# Patient Record
Sex: FEMALE | Race: WHITE | Employment: FULL TIME | URBAN - METROPOLITAN AREA
[De-identification: names, ages, dates, MRNs, and addresses within clinical notes are randomized per-mention and may not be internally consistent; named-entity substitution may affect disease eponyms.]

---

## 2023-11-02 ENCOUNTER — OFFICE VISIT (OUTPATIENT)
Dept: PAIN MANAGEMENT | Age: 43
End: 2023-11-02
Payer: MEDICARE

## 2023-11-02 VITALS
SYSTOLIC BLOOD PRESSURE: 125 MMHG | DIASTOLIC BLOOD PRESSURE: 85 MMHG | OXYGEN SATURATION: 100 % | WEIGHT: 213.6 LBS | HEART RATE: 66 BPM

## 2023-11-02 DIAGNOSIS — M54.16 LUMBAR RADICULOPATHY: ICD-10-CM

## 2023-11-02 DIAGNOSIS — Z79.891 CHRONIC PRESCRIPTION OPIATE USE: ICD-10-CM

## 2023-11-02 DIAGNOSIS — F41.1 GAD (GENERALIZED ANXIETY DISORDER): ICD-10-CM

## 2023-11-02 DIAGNOSIS — M51.36 LUMBAR DEGENERATIVE DISC DISEASE: ICD-10-CM

## 2023-11-02 DIAGNOSIS — G89.4 CHRONIC PAIN SYNDROME: ICD-10-CM

## 2023-11-02 DIAGNOSIS — Z51.81 ENCOUNTER FOR THERAPEUTIC DRUG MONITORING: ICD-10-CM

## 2023-11-02 DIAGNOSIS — F32.89 OTHER DEPRESSION: ICD-10-CM

## 2023-11-02 DIAGNOSIS — M47.817 LUMBOSACRAL SPONDYLOSIS WITHOUT MYELOPATHY: Primary | ICD-10-CM

## 2023-11-02 DIAGNOSIS — M47.816 ARTHRITIS OF LUMBAR SPINE: ICD-10-CM

## 2023-11-02 DIAGNOSIS — G57.12 MERALGIA PARESTHETICA, LEFT LOWER LIMB: ICD-10-CM

## 2023-11-02 PROCEDURE — 99204 OFFICE O/P NEW MOD 45 MIN: CPT | Performed by: NURSE PRACTITIONER

## 2023-11-02 RX ORDER — PANTOPRAZOLE SODIUM 40 MG/1
40 TABLET, DELAYED RELEASE ORAL 2 TIMES DAILY
COMMUNITY
Start: 2022-12-12

## 2023-11-02 RX ORDER — TIZANIDINE 4 MG/1
8 TABLET ORAL NIGHTLY PRN
COMMUNITY
Start: 2023-08-03

## 2023-11-02 RX ORDER — OXYCODONE HYDROCHLORIDE 5 MG/1
5 TABLET ORAL EVERY 6 HOURS PRN
Qty: 112 TABLET | Refills: 0 | Status: SHIPPED | OUTPATIENT
Start: 2023-11-02 | End: 2023-11-30

## 2023-11-02 RX ORDER — ESCITALOPRAM OXALATE 20 MG/1
20 TABLET ORAL DAILY
COMMUNITY
Start: 2020-09-09

## 2023-11-02 RX ORDER — ACYCLOVIR 400 MG/1
400 TABLET ORAL 2 TIMES DAILY
COMMUNITY
Start: 2020-08-26

## 2023-11-02 RX ORDER — DEXTROAMPHETAMINE SACCHARATE, AMPHETAMINE ASPARTATE, DEXTROAMPHETAMINE SULFATE AND AMPHETAMINE SULFATE 3.75; 3.75; 3.75; 3.75 MG/1; MG/1; MG/1; MG/1
15 TABLET ORAL DAILY
COMMUNITY
Start: 2023-10-17

## 2023-11-02 RX ORDER — SUCRALFATE ORAL 1 G/10ML
1 SUSPENSION ORAL 4 TIMES DAILY
COMMUNITY
Start: 2022-12-12

## 2023-11-02 RX ORDER — IBUPROFEN 800 MG/1
1 TABLET ORAL EVERY 8 HOURS PRN
COMMUNITY
Start: 2020-08-03

## 2023-11-02 RX ORDER — PANTOPRAZOLE SODIUM 40 MG/1
1 TABLET, DELAYED RELEASE ORAL DAILY
COMMUNITY
Start: 2020-06-25

## 2023-11-02 NOTE — PROGRESS NOTES
HISTORY OF PRESENT ILLNESS:  Ms. Jocy Correa is a 37 y.o. female presents for consultation at the request of Dr. Chong Rodriguez. Her presenting problems are right hip, LLE, tailbone, left lateral knee pain. She has also been evaluated by rheumatology, orthopedics, pain management. Onset of pain began about 13 years ago. She rates the pain 8/10 and describes it as sharp, aching, burning, cold sensation in calin area. Pain is greater in her left anterior thigh and right hip than other body regions. Pain is made worse by: Standing, walking, lifting, bending, sitting, cooking, vacuuming, laundry, carry groceries, yard work, getting in and out of chair, going up and down stairs, putting she socks on, working. Activities that have been limited by pain that she otherwise tolerated well are walking, working, ADLs, home exercises. Alternative therapies she has previously attempted are pain medication, anti-inflammatories, cervixes, steroids, injections, ice and heat packs, epidural steroid injections, tenderness, exercise by physical therapy. Current treatment regimen has helped relieve about 20% of the pain. Relieving factors of pain include pain medication, muscle relaxants, lying flat on his back, lying on side in bed position. Shedenies side effects from the current pain regimen. Patient reports mood is depressed and sleep patterns are Poor. Patient deniesneurological bowel or bladder. Patient denies misusing/abusing her narcotic pain medications or using any illegal drugs. she admits to morning stiffness, fatigue, and headaches. Patient reports she is within 30 pounds she has a 10, gastric sleeve surgery in 2012. She is working at [employer], she states has frequent falls related right hip popping frequently and low back pain. She states she is rheumatoid arthritis and is seeing a rheumatologist in [facility].   She is on Adderall for ADHD, history of gabapentin and Lyrica failed, r/t SE.      ROS:  Review of Systems

## 2023-11-06 ENCOUNTER — TELEPHONE (OUTPATIENT)
Dept: PAIN MANAGEMENT | Age: 43
End: 2023-11-06

## 2023-11-06 NOTE — TELEPHONE ENCOUNTER
Pt spoke with MATT about getting injections. She is currently in a lot of pain going down the left leg and wanted to now if these injections would help. She states the pain medication she was given is not working for the pain . Would like a call back about the injection information

## 2023-11-07 NOTE — TELEPHONE ENCOUNTER
I have not been able to finish her new patient note yet. I also cannot find her MRI of lumbar spine on file. If she wants to be seen urgently for an injection I can refer her to Dr. Parham in [place].

## 2023-11-08 NOTE — TELEPHONE ENCOUNTER
I called patient, explained previous notes.  She said she wants if MATT will give her a cortisone shot in the office.    She also said she reached out to [facility] on yesterday to see if about getting another MARYLIN.    Patient also stated that the Oxycodone 5mg is not working, and she was comfortable at with the 10 mg.  She is working at [employer] every date because they are working with her disability.    She said she is having bad shooting pains down her legs at night.    Please advise.

## 2023-11-14 PROBLEM — Z79.891 CHRONIC PRESCRIPTION OPIATE USE: Status: ACTIVE | Noted: 2023-11-14

## 2023-11-14 PROBLEM — M51.36 LUMBAR DEGENERATIVE DISC DISEASE: Status: ACTIVE | Noted: 2023-11-14

## 2023-11-14 PROBLEM — G89.4 CHRONIC PAIN SYNDROME: Status: ACTIVE | Noted: 2023-11-14

## 2023-11-14 PROBLEM — M51.369 LUMBAR DEGENERATIVE DISC DISEASE: Status: ACTIVE | Noted: 2023-11-14

## 2023-11-14 PROBLEM — M47.816 ARTHRITIS OF LUMBAR SPINE: Status: RESOLVED | Noted: 2023-11-14 | Resolved: 2023-11-14

## 2023-11-14 PROBLEM — M54.16 LUMBAR RADICULOPATHY: Status: ACTIVE | Noted: 2023-11-14

## 2023-11-14 PROBLEM — F32.A DEPRESSION: Status: ACTIVE | Noted: 2023-11-14

## 2023-11-14 PROBLEM — G57.12 MERALGIA PARESTHETICA, LEFT LOWER LIMB: Status: ACTIVE | Noted: 2023-11-14

## 2023-11-14 PROBLEM — M47.816 ARTHRITIS OF LUMBAR SPINE: Status: ACTIVE | Noted: 2023-11-14

## 2023-11-14 PROBLEM — Z51.81 ENCOUNTER FOR THERAPEUTIC DRUG MONITORING: Status: ACTIVE | Noted: 2023-11-14

## 2023-11-14 PROBLEM — M47.817 LUMBOSACRAL SPONDYLOSIS WITHOUT MYELOPATHY: Status: ACTIVE | Noted: 2023-11-14

## 2023-11-14 ASSESSMENT — ENCOUNTER SYMPTOMS
SHORTNESS OF BREATH: 0
BACK PAIN: 1
VOMITING: 0
WHEEZING: 0
ABDOMINAL PAIN: 0

## 2023-11-30 ENCOUNTER — OFFICE VISIT (OUTPATIENT)
Dept: PAIN MANAGEMENT | Age: 43
End: 2023-11-30
Payer: MEDICARE

## 2023-11-30 ENCOUNTER — TELEPHONE (OUTPATIENT)
Dept: PAIN MANAGEMENT | Age: 43
End: 2023-11-30

## 2023-11-30 VITALS
HEART RATE: 96 BPM | SYSTOLIC BLOOD PRESSURE: 132 MMHG | WEIGHT: 213 LBS | OXYGEN SATURATION: 99 % | DIASTOLIC BLOOD PRESSURE: 89 MMHG

## 2023-11-30 DIAGNOSIS — M47.817 LUMBOSACRAL SPONDYLOSIS WITHOUT MYELOPATHY: Primary | ICD-10-CM

## 2023-11-30 DIAGNOSIS — G89.4 CHRONIC PAIN SYNDROME: ICD-10-CM

## 2023-11-30 DIAGNOSIS — M51.36 LUMBAR DEGENERATIVE DISC DISEASE: ICD-10-CM

## 2023-11-30 DIAGNOSIS — Z51.81 ENCOUNTER FOR THERAPEUTIC DRUG MONITORING: ICD-10-CM

## 2023-11-30 DIAGNOSIS — Z79.891 CHRONIC PRESCRIPTION OPIATE USE: ICD-10-CM

## 2023-11-30 DIAGNOSIS — F32.89 OTHER DEPRESSION: ICD-10-CM

## 2023-11-30 DIAGNOSIS — M47.817 LUMBOSACRAL SPONDYLOSIS WITHOUT MYELOPATHY: ICD-10-CM

## 2023-11-30 DIAGNOSIS — F41.1 GAD (GENERALIZED ANXIETY DISORDER): ICD-10-CM

## 2023-11-30 DIAGNOSIS — M47.816 ARTHRITIS OF LUMBAR SPINE: ICD-10-CM

## 2023-11-30 DIAGNOSIS — G57.12 MERALGIA PARESTHETICA, LEFT LOWER LIMB: ICD-10-CM

## 2023-11-30 DIAGNOSIS — M54.16 LUMBAR RADICULOPATHY: ICD-10-CM

## 2023-11-30 PROCEDURE — 99213 OFFICE O/P EST LOW 20 MIN: CPT | Performed by: NURSE PRACTITIONER

## 2023-11-30 RX ORDER — OXYCODONE HYDROCHLORIDE 5 MG/1
5 TABLET ORAL EVERY 6 HOURS PRN
Qty: 112 TABLET | Refills: 0 | Status: SHIPPED | OUTPATIENT
Start: 2023-11-30 | End: 2023-12-28

## 2023-11-30 RX ORDER — BUPRENORPHINE 5 UG/H
1 PATCH TRANSDERMAL WEEKLY
Qty: 4 PATCH | Refills: 0 | Status: SHIPPED | OUTPATIENT
Start: 2023-11-30 | End: 2023-12-30

## 2023-11-30 NOTE — TELEPHONE ENCOUNTER
Submitted PA for [facility] Key: [id number] STATUS: PENDING. Follow up done daily; if no response in three days we will refax for status check. If another three days goes by with no response we will call the insurance for status.

## 2023-11-30 NOTE — TELEPHONE ENCOUNTER
Pt needs buprenorphine (BUTRANS) 5 MCG/HR PTWK sent to pharmacy in [place]     [facility] [address] [place] [address]  Ph: [phone]

## 2023-11-30 NOTE — PROGRESS NOTES
Lauren Craft  1980  [id number]      HISTORY OF PRESENT ILLNESS: Ms. Greyson Coates is a 37 y.o. female returns for a follow up visit for pain management  She has a diagnosis of   1. Lumbosacral spondylosis without myelopathy    2. Encounter for therapeutic drug monitoring    3. Lumbar radiculopathy    4. Lumbar degenerative disc disease    5. Chronic pain syndrome    6. Other depression    7. ASHLEY (generalized anxiety disorder)    8. Arthritis of lumbar spine    9. Meralgia paresthetica, left lower limb    10. Chronic prescription opiate use    . New Medications since Last Office visit have been reviewed with patient. As per Information Obtained from the PADT (Patient Assessment and Documentation Tool)    She complains of pain in the lower back, bilateral legs and feet. She rates the pain 8/10 and describes it as sharp, aching, pins and needles. Current treatment regimen has helped relieve about 30% of the pain since beginning treatment plan. She denies any side effects from the current pain regimen. Patient reports that since implementation of their treatment plan; their physical functioning is unchanged, family/social relationships are unchanged, mood is unchanged sleep patterns are worse, and that the overall functioning is unchanged. Patient denies/admits that any of the above have changed since last office visit. Patient denies misusing/abusing her narcotic pain medications or using any illegal drugs. Upon obtaining medical history from Ms. Greyson Coates    ALLERGIES: Patients list of allergies were reviewed     MEDICATIONS: Ms. Greyson Coates list of medications were reviewed. Her current medications are   Outpatient Medications Prior to Visit   Medication Sig Dispense Refill    escitalopram (LEXAPRO) 20 MG tablet Take 1 tablet by mouth daily      pantoprazole (PROTONIX) 40 MG tablet Take 1 tablet by mouth 2 times daily      acyclovir (ZOVIRAX) 400 MG tablet Take 1 tablet by mouth 2 times daily      sucralfate

## 2023-12-01 NOTE — TELEPHONE ENCOUNTER
. Resend medication notification. Patient would like medication Kwaku Perea resend to name of pharmacy   [address] [place] [address], [address] [place] [address]       pharmacy  # [phone]     Reason:Medication is too much in [place] she needs to be sent to the pharmacy in [place].

## 2023-12-04 RX ORDER — BUPRENORPHINE 5 UG/H
1 PATCH TRANSDERMAL WEEKLY
Qty: 4 PATCH | Refills: 0 | Status: SHIPPED | OUTPATIENT
Start: 2023-12-04 | End: 2023-12-21

## 2023-12-04 NOTE — TELEPHONE ENCOUNTER
Which medication is requiring PA. Please advise. There is Roxicodone and Butrans listed in below message? Rodney Mora has an approval documented in Encounter 11/30/2023. If this requires a response please respond to the pool ( P [facility]X [address]). Thank you please advise patient.

## 2023-12-05 NOTE — TELEPHONE ENCOUNTER
Patient called and the PA was approved but the PA was approved for 30 days but the prescription is written for 28 days. A new prescription needs to be sent for 30 days to [facility] in [facility] [phone].  Please call pt when the new prescription is sent over

## 2023-12-05 NOTE — TELEPHONE ENCOUNTER
The current APPROVAL is still good until 5/29/2024. Location of the pharmacy no matter. If the patient's insurance requires use of specific pharmacy nothing PA can do. I called the patient, and figured out that the pharmacy is trying to fill BUPRENORPHINE PATCH. The APPROVAL is for BUTRANS. The pharmacy was notified by  to run though [facility]. If this requires a response please respond to the pool ( P [facility]X [address]). Thank you please advise patient.

## 2023-12-05 NOTE — TELEPHONE ENCOUNTER
Spoke with Pt and yes the medication that is needed is Eamon   Pa was approved for [facility],   But the needed PA is for the Eduin Castroon.      Is that 11/30/23 approval still valid

## 2023-12-06 ENCOUNTER — TELEPHONE (OUTPATIENT)
Dept: PAIN MANAGEMENT | Age: 43
End: 2023-12-06

## 2023-12-06 NOTE — TELEPHONE ENCOUNTER
Pt called stating new script is needed for the Butrans 5 MCG/HRPTWK. PA was approved for 30 days but the script was only for 28days. Pharmacy wont fill due to the mismatch  qty    Please advised.

## 2023-12-07 ENCOUNTER — PATIENT MESSAGE (OUTPATIENT)
Dept: PAIN MANAGEMENT | Age: 43
End: 2023-12-07

## 2023-12-07 DIAGNOSIS — M51.36 LUMBAR DEGENERATIVE DISC DISEASE: ICD-10-CM

## 2023-12-07 DIAGNOSIS — G89.4 CHRONIC PAIN SYNDROME: ICD-10-CM

## 2023-12-07 DIAGNOSIS — M54.16 LUMBAR RADICULOPATHY: ICD-10-CM

## 2023-12-07 DIAGNOSIS — Z51.81 ENCOUNTER FOR THERAPEUTIC DRUG MONITORING: ICD-10-CM

## 2023-12-08 RX ORDER — GABAPENTIN 600 MG/1
600 TABLET ORAL 3 TIMES DAILY
Qty: 90 TABLET | Refills: 0 | Status: SHIPPED | OUTPATIENT
Start: 2023-12-08 | End: 2024-01-07

## 2023-12-08 NOTE — TELEPHONE ENCOUNTER
From: Hernesto To  To: Garfield Reid  Sent: 12/7/2023 12:35 PM EST  Subject: Kyleigh Solano, I was needing a refill on my 600mg. Kyleigh  To be sent to [facility] in [place]. [phone].

## 2024-01-02 ENCOUNTER — TELEPHONE (OUTPATIENT)
Dept: PAIN MANAGEMENT | Age: 44
End: 2024-01-02

## 2024-01-02 DIAGNOSIS — G89.4 CHRONIC PAIN SYNDROME: ICD-10-CM

## 2024-01-02 DIAGNOSIS — M51.36 LUMBAR DEGENERATIVE DISC DISEASE: ICD-10-CM

## 2024-01-02 DIAGNOSIS — M54.16 LUMBAR RADICULOPATHY: ICD-10-CM

## 2024-01-02 DIAGNOSIS — Z51.81 ENCOUNTER FOR THERAPEUTIC DRUG MONITORING: ICD-10-CM

## 2024-01-02 NOTE — TELEPHONE ENCOUNTER
Resend medication notification.    Who is requesting the change? Luna Bro    Reason why medication needs to be sent:  Prescription was sent to the wrong pharmacy    Medication Name: Gabapentin    Send to pharmacy: [facility] [place], [place]     Pharmacy #[phone]

## 2024-01-03 ENCOUNTER — PATIENT MESSAGE (OUTPATIENT)
Dept: PAIN MANAGEMENT | Age: 44
End: 2024-01-03

## 2024-01-03 NOTE — TELEPHONE ENCOUNTER
From: Laurel Bro  To: Cristinaendy Moreno  Sent: 1/3/2024 6:21 AM EST  Subject: Migraine medication     Hi Cristina,  Oh my goodness girl. The sample of Ubrelvy you gave me has done wonders for my migraines and I'd go a day or two after taking the medicine without having another one. Do I have to wait till my next appt on the 15th to get a full script as needed? Also, I was going over with my medicaid coverage with a . And they brought it to my attention that you didn't sign up this year to be [place] as a provider [place] so my medicine is covered on my insurance other than the pain medicine that is. Thank you so much for your time and care.   Laurel Bro

## 2024-01-05 DIAGNOSIS — M51.36 LUMBAR DEGENERATIVE DISC DISEASE: ICD-10-CM

## 2024-01-05 DIAGNOSIS — G89.4 CHRONIC PAIN SYNDROME: ICD-10-CM

## 2024-01-05 DIAGNOSIS — Z51.81 ENCOUNTER FOR THERAPEUTIC DRUG MONITORING: ICD-10-CM

## 2024-01-05 DIAGNOSIS — M54.16 LUMBAR RADICULOPATHY: ICD-10-CM

## 2024-01-08 RX ORDER — GABAPENTIN 600 MG/1
600 TABLET ORAL 3 TIMES DAILY
Qty: 90 TABLET | Refills: 1 | Status: SHIPPED | OUTPATIENT
Start: 2024-01-08 | End: 2024-03-08

## 2024-01-08 RX ORDER — UBROGEPANT 100 MG/1
TABLET ORAL
Qty: 8 TABLET | Refills: 0 | Status: SHIPPED | OUTPATIENT
Start: 2024-01-08

## 2024-01-08 NOTE — TELEPHONE ENCOUNTER
Patient was notified via [employer] that Gabapentin was sent to [facility] in [place], [place].

## 2024-01-09 ENCOUNTER — TELEPHONE (OUTPATIENT)
Dept: PAIN MANAGEMENT | Age: 44
End: 2024-01-09

## 2024-01-09 DIAGNOSIS — G43.909 EPISODIC MIGRAINE: Primary | ICD-10-CM

## 2024-01-09 NOTE — TELEPHONE ENCOUNTER
Submitted PA for Ubrelvy Via [facility] Key: [id number] STATUS: NOT SENT    What is the MIGRAINE diagnoses?    Please respond to the pool ( P MHCX PSC MEDICATION PRE-AUTH).      Thank you.

## 2024-01-11 RX ORDER — GABAPENTIN 600 MG/1
TABLET ORAL
Qty: 90 TABLET | Refills: 1 | OUTPATIENT
Start: 2024-01-11

## 2024-01-12 PROBLEM — G43.909 EPISODIC MIGRAINE: Status: ACTIVE | Noted: 2024-01-12

## 2024-01-15 ENCOUNTER — OFFICE VISIT (OUTPATIENT)
Dept: PAIN MANAGEMENT | Age: 44
End: 2024-01-15
Payer: MEDICARE

## 2024-01-15 ENCOUNTER — TELEPHONE (OUTPATIENT)
Dept: PAIN MANAGEMENT | Age: 44
End: 2024-01-15

## 2024-01-15 VITALS
SYSTOLIC BLOOD PRESSURE: 128 MMHG | WEIGHT: 221 LBS | HEART RATE: 83 BPM | OXYGEN SATURATION: 100 % | DIASTOLIC BLOOD PRESSURE: 82 MMHG

## 2024-01-15 DIAGNOSIS — G43.909 EPISODIC MIGRAINE: ICD-10-CM

## 2024-01-15 DIAGNOSIS — G57.12 MERALGIA PARESTHETICA, LEFT LOWER LIMB: ICD-10-CM

## 2024-01-15 DIAGNOSIS — Z79.891 CHRONIC PRESCRIPTION OPIATE USE: ICD-10-CM

## 2024-01-15 DIAGNOSIS — M47.816 ARTHRITIS OF LUMBAR SPINE: ICD-10-CM

## 2024-01-15 DIAGNOSIS — G89.4 CHRONIC PAIN SYNDROME: ICD-10-CM

## 2024-01-15 DIAGNOSIS — M47.817 LUMBOSACRAL SPONDYLOSIS WITHOUT MYELOPATHY: Primary | ICD-10-CM

## 2024-01-15 DIAGNOSIS — M51.36 LUMBAR DEGENERATIVE DISC DISEASE: ICD-10-CM

## 2024-01-15 DIAGNOSIS — F41.1 GAD (GENERALIZED ANXIETY DISORDER): ICD-10-CM

## 2024-01-15 DIAGNOSIS — F32.89 OTHER DEPRESSION: ICD-10-CM

## 2024-01-15 DIAGNOSIS — M54.16 LUMBAR RADICULOPATHY: ICD-10-CM

## 2024-01-15 DIAGNOSIS — Z51.81 ENCOUNTER FOR THERAPEUTIC DRUG MONITORING: ICD-10-CM

## 2024-01-15 PROCEDURE — 99214 OFFICE O/P EST MOD 30 MIN: CPT | Performed by: NURSE PRACTITIONER

## 2024-01-15 PROCEDURE — 20553 NJX 1/MLT TRIGGER POINTS 3/>: CPT | Performed by: NURSE PRACTITIONER

## 2024-01-15 RX ORDER — BUPRENORPHINE 5 UG/H
1 PATCH TRANSDERMAL WEEKLY
Qty: 4 PATCH | Refills: 0 | Status: SHIPPED | OUTPATIENT
Start: 2024-01-15 | End: 2024-02-12

## 2024-01-15 RX ORDER — UBROGEPANT 100 MG/1
TABLET ORAL
Qty: 8 TABLET | Refills: 0 | Status: SHIPPED | OUTPATIENT
Start: 2024-01-15

## 2024-01-15 RX ORDER — OXYCODONE HYDROCHLORIDE 5 MG/1
5 TABLET ORAL EVERY 6 HOURS PRN
Qty: 112 TABLET | Refills: 0 | Status: SHIPPED | OUTPATIENT
Start: 2024-01-15 | End: 2024-02-12

## 2024-01-15 RX ORDER — CLINDAMYCIN HYDROCHLORIDE 150 MG/1
150 CAPSULE ORAL 4 TIMES DAILY
COMMUNITY

## 2024-01-15 RX ORDER — GABAPENTIN 600 MG/1
600 TABLET ORAL 3 TIMES DAILY
Qty: 90 TABLET | Refills: 1 | Status: SHIPPED | OUTPATIENT
Start: 2024-01-15 | End: 2024-03-15

## 2024-01-15 RX ORDER — TRIAMCINOLONE ACETONIDE 40 MG/ML
40 INJECTION, SUSPENSION INTRA-ARTICULAR; INTRAMUSCULAR ONCE
Status: COMPLETED | OUTPATIENT
Start: 2024-01-15 | End: 2024-01-15

## 2024-01-15 RX ADMIN — TRIAMCINOLONE ACETONIDE 40 MG: 40 INJECTION, SUSPENSION INTRA-ARTICULAR; INTRAMUSCULAR at 16:32

## 2024-01-15 NOTE — TELEPHONE ENCOUNTER
Submitted PA for Ubrelvy Via [facility] Key: [id number] STATUS: PENDING.    Follow up done daily; if no decision with in three days we will refax.  If another three days goes by with no decision will call the insurance for status.

## 2024-01-15 NOTE — TELEPHONE ENCOUNTER
The medication was DENIED; DENIAL letter uploaded to MEDIA.    Must Try/Fail Two Triptan's.    If you want an APPEAL; please note in this encounter what new information you would like to APPEAL with.  Once complete route back to PA POOL.    If this requires a response please respond to the pool ( P MHCX PSC MEDICATION PRE-AUTH).      Thank you please advise patient.

## 2024-01-15 NOTE — PROGRESS NOTES
Laurel Bro  1980  [id number]      HISTORY OF PRESENT ILLNESS: Ms. Bro is a 43 y.o. female returns for a follow up visit for pain management  She has a diagnosis of   1. Lumbosacral spondylosis without myelopathy    2. Episodic migraine    3. Encounter for therapeutic drug monitoring    4. Lumbar radiculopathy    5. Lumbar degenerative disc disease    6. Chronic pain syndrome    7. Other depression    8. ASHLEY (generalized anxiety disorder)    9. Arthritis of lumbar spine    10. Meralgia paresthetica, left lower limb    11. Chronic prescription opiate use    .      New Medications since Last Office visit have been reviewed with patient.     As per Information Obtained from the PADT (Patient Assessment and Documentation Tool)    She complains of pain in the lower back, buttocks, bilateral hands, bilateral legs and feet. She rates the pain 8/10 and describes it as sharp, aching, burning, numbness, pins and needles. Current treatment regimen has helped relieve about 40% of the pain since beginning treatment plan.  She denies any side effects from the current pain regimen. Patient reports that since implementation of their treatment plan; their physical functioning is unchanged, family/social relationships are unchanged, mood is unchanged sleep patterns are unchanged, and that the overall functioning is unchanged.  Patient denies/admits that any of the above have changed since last office visit. Patient denies misusing/abusing her narcotic pain medications or using any illegal drugs.      Upon obtaining medical history from Ms. Bro    ALLERGIES: Patients list of allergies were reviewed     MEDICATIONS: Ms. Bro list of medications were reviewed.Her current medications are   Outpatient Medications Prior to Visit   Medication Sig Dispense Refill    clindamycin (CLEOCIN) 150 MG capsule Take 1 capsule by mouth 4 times daily      gabapentin (NEURONTIN) 600 MG tablet Take 1 tablet by mouth 3 times daily for 60

## 2024-01-15 NOTE — TELEPHONE ENCOUNTER
Pharmacy said that KAK is not I rolled under [place] and patient will need a PA or a different provider. Please call the pharmacy.       Please advice

## 2024-01-16 ENCOUNTER — PATIENT MESSAGE (OUTPATIENT)
Dept: PAIN MANAGEMENT | Age: 44
End: 2024-01-16

## 2024-01-17 ENCOUNTER — TELEPHONE (OUTPATIENT)
Dept: PAIN MANAGEMENT | Age: 44
End: 2024-01-17

## 2024-01-17 NOTE — TELEPHONE ENCOUNTER
Duplicate Encounter    Same message was sent via [employer] on yesterday 1/16/24. Waiting on a reply from MATT.    Closing this encounter

## 2024-01-17 NOTE — TELEPHONE ENCOUNTER
Patient would like to know if her gabapentin could be increase from 600 mg to 800 mg TID.   Patient would like for the scribe sent to [facility] [place] [facility].     Please advise

## 2024-01-18 ENCOUNTER — TELEPHONE (OUTPATIENT)
Dept: PAIN MANAGEMENT | Age: 44
End: 2024-01-18

## 2024-01-18 NOTE — TELEPHONE ENCOUNTER
Patient called back again about yesterdays messages and patient is asking for KAK to up her Gabapentin to 800 or 1200 mg patient said that whatever KAK fills is right. Please call patient. She is asking that the script be sent to [facility]

## 2024-01-18 NOTE — TELEPHONE ENCOUNTER
She just filled her gabapentin 5 days ago. We can discuss an increase at the next office visit when she is due for a refill.

## 2024-02-08 ENCOUNTER — TELEPHONE (OUTPATIENT)
Dept: PAIN MANAGEMENT | Age: 44
End: 2024-02-08

## 2024-02-08 ENCOUNTER — OFFICE VISIT (OUTPATIENT)
Dept: PAIN MANAGEMENT | Age: 44
End: 2024-02-08
Payer: MEDICARE

## 2024-02-08 VITALS
DIASTOLIC BLOOD PRESSURE: 87 MMHG | HEART RATE: 67 BPM | WEIGHT: 218 LBS | SYSTOLIC BLOOD PRESSURE: 150 MMHG | OXYGEN SATURATION: 99 %

## 2024-02-08 DIAGNOSIS — F32.89 OTHER DEPRESSION: ICD-10-CM

## 2024-02-08 DIAGNOSIS — M51.36 LUMBAR DEGENERATIVE DISC DISEASE: ICD-10-CM

## 2024-02-08 DIAGNOSIS — Z79.891 CHRONIC PRESCRIPTION OPIATE USE: ICD-10-CM

## 2024-02-08 DIAGNOSIS — F41.1 GAD (GENERALIZED ANXIETY DISORDER): ICD-10-CM

## 2024-02-08 DIAGNOSIS — M54.16 LUMBAR RADICULOPATHY: ICD-10-CM

## 2024-02-08 DIAGNOSIS — G57.12 MERALGIA PARESTHETICA, LEFT LOWER LIMB: ICD-10-CM

## 2024-02-08 DIAGNOSIS — Z51.81 ENCOUNTER FOR THERAPEUTIC DRUG MONITORING: ICD-10-CM

## 2024-02-08 DIAGNOSIS — G43.909 EPISODIC MIGRAINE: ICD-10-CM

## 2024-02-08 DIAGNOSIS — M47.817 LUMBOSACRAL SPONDYLOSIS WITHOUT MYELOPATHY: Primary | ICD-10-CM

## 2024-02-08 DIAGNOSIS — G89.4 CHRONIC PAIN SYNDROME: ICD-10-CM

## 2024-02-08 DIAGNOSIS — M47.816 ARTHRITIS OF LUMBAR SPINE: ICD-10-CM

## 2024-02-08 PROCEDURE — 99213 OFFICE O/P EST LOW 20 MIN: CPT | Performed by: NURSE PRACTITIONER

## 2024-02-08 RX ORDER — BUPRENORPHINE 5 UG/H
1 PATCH TRANSDERMAL WEEKLY
Qty: 4 PATCH | Refills: 0 | Status: SHIPPED | OUTPATIENT
Start: 2024-02-17 | End: 2024-03-16

## 2024-02-08 RX ORDER — OXYCODONE HYDROCHLORIDE 5 MG/1
5 TABLET ORAL EVERY 6 HOURS PRN
Qty: 112 TABLET | Refills: 0 | Status: SHIPPED | OUTPATIENT
Start: 2024-02-13 | End: 2024-03-12

## 2024-02-08 RX ORDER — LISINOPRIL 10 MG/1
1 TABLET ORAL DAILY
COMMUNITY
Start: 2023-06-26

## 2024-02-08 RX ORDER — GABAPENTIN 600 MG/1
600 TABLET ORAL 3 TIMES DAILY
Qty: 90 TABLET | Refills: 1 | Status: SHIPPED | OUTPATIENT
Start: 2024-02-08 | End: 2024-04-08

## 2024-02-08 NOTE — PROGRESS NOTES
Laurel Bro  1980  [id number]      HISTORY OF PRESENT ILLNESS: Ms. Bro is a 43 y.o. female returns for a follow up visit for pain management  She has a diagnosis of   1. Lumbosacral spondylosis without myelopathy    2. Episodic migraine    3. Encounter for therapeutic drug monitoring    4. Lumbar radiculopathy    5. Lumbar degenerative disc disease    6. Chronic pain syndrome    7. Other depression    8. Arthritis of lumbar spine    9. ASHLEY (generalized anxiety disorder)    10. Meralgia paresthetica, left lower limb    11. Chronic prescription opiate use    .      New Medications since Last Office visit have been reviewed with patient.     As per Information Obtained from the PADT (Patient Assessment and Documentation Tool)    She complains of pain in the lower back, bilateral hands, bilateral legs and feet. She rates the pain 6/10 and describes it as sharp, aching, burning, numbness, pins and needles. Current treatment regimen has helped relieve about 60% of the pain since beginning treatment plan.  She has constipation any side effects from the current pain regimen. Patient reports that since implementation of their treatment plan; their physical functioning is better, family/social relationships are unchanged, mood is unchanged sleep patterns are unchanged, and that the overall functioning is unchanged.  Patient denies/admits that any of the above have changed since last office visit. Patient denies misusing/abusing her narcotic pain medications or using any illegal drugs.      Upon obtaining medical history from Ms. Bro    ALLERGIES: Patients list of allergies were reviewed     MEDICATIONS: Ms. Bro list of medications were reviewed.Her current medications are   Outpatient Medications Prior to Visit   Medication Sig Dispense Refill    lisinopril (PRINIVIL;ZESTRIL) 10 MG tablet Take 1 tablet by mouth daily      clindamycin (CLEOCIN) 150 MG capsule Take 1 capsule by mouth 4 times daily

## 2024-02-08 NOTE — TELEPHONE ENCOUNTER
Patient would like MATT to know that she called the pharmacy and they told her she could get her medication 3 days early is Cristina called and approve it she states she is going out of town.  .

## 2024-02-08 NOTE — TELEPHONE ENCOUNTER
Pt said she spoke to the pharmacist and they said that she will be able to fill her medication early just as long as MATT gives them permission. She said the pharmacy tried to leave MATT  a message.

## 2024-02-09 NOTE — TELEPHONE ENCOUNTER
I called patient, explained previous notes - she voiced understanding.    However, patient said she filled her Oxycodone on 1/15/24 and thinks she should be able to fill her script on 2/12/24, not 2/13/24 that written on the script.    Please advise

## 2024-02-12 NOTE — TELEPHONE ENCOUNTER
Pt needs the pharmacy called so that she can  her meds today. They said they will not fill it since its dated 2/13 and they need permission before they can fill it.

## 2024-02-12 NOTE — TELEPHONE ENCOUNTER
I called pharmacy, explained that patient can  script today per KAMARBIN. He voiced understanding.

## 2024-02-20 ENCOUNTER — TELEPHONE (OUTPATIENT)
Dept: PAIN MANAGEMENT | Age: 44
End: 2024-02-20

## 2024-02-20 DIAGNOSIS — G89.4 CHRONIC PAIN SYNDROME: ICD-10-CM

## 2024-02-20 DIAGNOSIS — G57.12 MERALGIA PARESTHETICA, LEFT LOWER LIMB: ICD-10-CM

## 2024-02-20 DIAGNOSIS — Z51.81 ENCOUNTER FOR THERAPEUTIC DRUG MONITORING: ICD-10-CM

## 2024-02-20 DIAGNOSIS — G43.909 EPISODIC MIGRAINE: ICD-10-CM

## 2024-02-20 DIAGNOSIS — M47.817 LUMBOSACRAL SPONDYLOSIS WITHOUT MYELOPATHY: ICD-10-CM

## 2024-02-20 DIAGNOSIS — Z79.891 CHRONIC PRESCRIPTION OPIATE USE: ICD-10-CM

## 2024-02-20 DIAGNOSIS — M51.36 LUMBAR DEGENERATIVE DISC DISEASE: ICD-10-CM

## 2024-02-20 DIAGNOSIS — M47.816 ARTHRITIS OF LUMBAR SPINE: ICD-10-CM

## 2024-02-20 DIAGNOSIS — M54.16 LUMBAR RADICULOPATHY: ICD-10-CM

## 2024-02-20 DIAGNOSIS — F41.1 GAD (GENERALIZED ANXIETY DISORDER): ICD-10-CM

## 2024-02-20 DIAGNOSIS — F32.89 OTHER DEPRESSION: ICD-10-CM

## 2024-02-20 RX ORDER — BUPRENORPHINE 5 UG/H
1 PATCH TRANSDERMAL WEEKLY
Qty: 4 PATCH | Refills: 0 | Status: SHIPPED | OUTPATIENT
Start: 2024-02-20 | End: 2024-03-19

## 2024-02-20 NOTE — TELEPHONE ENCOUNTER
[facility] is out of meds, buprenorphine (BUTRANS) 5 MCG/HR PTWK.     Pt request to call in to [facility]   [address] [address] @  [phone]    Please advise.

## 2024-02-23 ENCOUNTER — TELEPHONE (OUTPATIENT)
Dept: PAIN MANAGEMENT | Age: 44
End: 2024-02-23

## 2024-02-23 NOTE — TELEPHONE ENCOUNTER
Please call pt in for random pill count and UDS on 02/29/2024. Fill date per O[facility] was 02/12/2024 for #112 oxycodone 5mg tabs

## 2024-02-23 NOTE — TELEPHONE ENCOUNTER
Pt is getting surgery on her hand for carpel tunnel on 3/13. Her surgeon was trying to prescribe her break through medication since her pain is severe and they couldn't since she is contract with MATT she wants to know if MATT can prescribe her something for the pain. The surgeon advise her to call MATT for a script.    Please advise pt.

## 2024-02-29 NOTE — TELEPHONE ENCOUNTER
Pt called back and stating she was in [place] and comes back Sunday . She stated that she doesn't carry all her medication on her so she can't go to the pharmacy for the pill count and also wants to know if she can come in Monday for it. She would like someone to call her back as well.

## 2024-02-29 NOTE — TELEPHONE ENCOUNTER
This is the first attempt to call this pt into the office for a pill count. She will need to come into the office today with ALL her medications, pain medications included, for review. If she is unable to come in today, she will need to come into the  [place]  office tomorrow, or the  [employer]  office the day after that. In the event she is out of town or is unable to get to the office, she will need to go to his local pharmacy to complete the pill count there. Results will need to be on pharmacy letter head with the name and signature of the pharmacist performing the count and will need to be faxed to [phone]. She was not available, a message was left.

## 2024-02-29 NOTE — TELEPHONE ENCOUNTER
Per MATT: It is ok if she is out of state. She needs to go to a local gas station or store and buy something small, i.e, a small pack of gum. She can take a picture of the receipt and upload it to [employer]. That would allow her to bring the medications in on Mo nday.     I called patient back, explained MATT's above note. She she could take a picture of a receipt, but she doesn't know if she'll be back in town by Monday.    She is helping her son move into an apartment and she's not sure when she is coming back.    I asked why she didn't take her full script with her if she didn't know when she would be returning home. She mention having a weekly pill container, but did not answer the question.

## 2024-03-07 ENCOUNTER — TELEPHONE (OUTPATIENT)
Dept: PAIN MANAGEMENT | Age: 44
End: 2024-03-07

## 2024-03-07 ENCOUNTER — OFFICE VISIT (OUTPATIENT)
Dept: PAIN MANAGEMENT | Age: 44
End: 2024-03-07

## 2024-03-07 VITALS
SYSTOLIC BLOOD PRESSURE: 115 MMHG | DIASTOLIC BLOOD PRESSURE: 74 MMHG | WEIGHT: 216 LBS | OXYGEN SATURATION: 98 % | HEART RATE: 68 BPM

## 2024-03-07 DIAGNOSIS — F41.1 GAD (GENERALIZED ANXIETY DISORDER): ICD-10-CM

## 2024-03-07 DIAGNOSIS — M47.817 LUMBOSACRAL SPONDYLOSIS WITHOUT MYELOPATHY: ICD-10-CM

## 2024-03-07 DIAGNOSIS — G89.4 CHRONIC PAIN SYNDROME: ICD-10-CM

## 2024-03-07 DIAGNOSIS — G57.12 MERALGIA PARESTHETICA, LEFT LOWER LIMB: Primary | ICD-10-CM

## 2024-03-07 DIAGNOSIS — Z51.81 ENCOUNTER FOR THERAPEUTIC DRUG MONITORING: ICD-10-CM

## 2024-03-07 DIAGNOSIS — F32.89 OTHER DEPRESSION: ICD-10-CM

## 2024-03-07 DIAGNOSIS — M51.36 LUMBAR DEGENERATIVE DISC DISEASE: ICD-10-CM

## 2024-03-07 DIAGNOSIS — M54.16 LUMBAR RADICULOPATHY: ICD-10-CM

## 2024-03-07 DIAGNOSIS — M47.816 ARTHRITIS OF LUMBAR SPINE: ICD-10-CM

## 2024-03-07 DIAGNOSIS — G43.909 EPISODIC MIGRAINE: ICD-10-CM

## 2024-03-07 DIAGNOSIS — Z79.891 CHRONIC PRESCRIPTION OPIATE USE: ICD-10-CM

## 2024-03-07 RX ORDER — NABUMETONE 750 MG/1
750 TABLET, FILM COATED ORAL 2 TIMES DAILY
COMMUNITY
Start: 2024-02-29

## 2024-03-07 RX ORDER — DICLOFENAC SODIUM 75 MG/1
75 TABLET, DELAYED RELEASE ORAL 2 TIMES DAILY
COMMUNITY

## 2024-03-07 RX ORDER — BUPRENORPHINE 10 UG/H
1 PATCH TRANSDERMAL WEEKLY
Qty: 4 PATCH | Refills: 0 | Status: SHIPPED | OUTPATIENT
Start: 2024-03-07 | End: 2024-04-04

## 2024-03-07 NOTE — TELEPHONE ENCOUNTER
Patient called stated that while she was here for her follow up appointment with you was going to talk to her about something stronger for her pain and she supposed to put it inside of her cheek, she also stated if you could increase her gabapentin for her to help with her carpal tunnel    Patient requesting a call back.    Please be advised.

## 2024-03-07 NOTE — PROGRESS NOTES
Laurel Bro  1980  [id number]      HISTORY OF PRESENT ILLNESS: Ms. Bro is a 43 y.o. female returns for a follow up visit for pain management  She has a diagnosis of   1. Meralgia paresthetica, left lower limb    2. Episodic migraine    3. Encounter for therapeutic drug monitoring    4. Lumbar radiculopathy    5. Lumbar degenerative disc disease    6. Chronic pain syndrome    7. Other depression    8. Arthritis of lumbar spine    9. ASHLEY (generalized anxiety disorder)    10. Lumbosacral spondylosis without myelopathy    11. Chronic prescription opiate use    .      New Medications since Last Office visit have been reviewed with patient.     As per Information Obtained from the PADT (Patient Assessment and Documentation Tool)    She complains of pain in the lower back, buttocks, bilateral hands, left leg and foot. She rates the pain 10/10 and describes it as sharp, aching, pins and needles. Current treatment regimen has helped relieve about 0% of the pain since beginning treatment plan.  She denies any side effects from the current pain regimen. Patient reports that since implementation of their treatment plan; their physical functioning is better, family/social relationships are unchanged, mood is unchanged sleep patterns are worse, and that the overall functioning is unchanged.  Patient denies/admits that any of the above have changed since last office visit. Patient denies misusing/abusing her narcotic pain medications or using any illegal drugs.      Upon obtaining medical history from Ms. Bro    ALLERGIES: Patients list of allergies were reviewed     MEDICATIONS: Ms. Bro list of medications were reviewed.Her current medications are   Outpatient Medications Prior to Visit   Medication Sig Dispense Refill    nabumetone (RELAFEN) 750 MG tablet Take 1 tablet by mouth 2 times daily      diclofenac (VOLTAREN) 75 MG EC tablet Take 1 tablet by mouth 2 times daily      buprenorphine (BUTRANS) 5 MCG/HR PTWK

## 2024-03-08 ENCOUNTER — TELEPHONE (OUTPATIENT)
Dept: PAIN MANAGEMENT | Age: 44
End: 2024-03-08

## 2024-03-08 NOTE — TELEPHONE ENCOUNTER
Pt called back abou the increase in her gabapentin. Also her pharmacy is not ordering the name brand Butrans patches any more. She needs the script sent for the generic. It may need a PA.

## 2024-03-08 NOTE — TELEPHONE ENCOUNTER
Pharmacy told patient PA is required for buprenorphine (BUTRANS) 10 MCG/HR PTWK.    Please start PA

## 2024-03-08 NOTE — TELEPHONE ENCOUNTER
Submitted PA for Butrans [facility] Key: [id number] STATUS: \"Member has an active PA on file which is expiring on 05/29/2024.\"    I did see the approval is for BUTRANS 5 MCG/HR  and I submitted this for BUTRANS 10 MCG/HR.     With that response, a PA is not needed for dosage increase.

## 2024-04-01 ENCOUNTER — TELEPHONE (OUTPATIENT)
Dept: PAIN MANAGEMENT | Age: 44
End: 2024-04-01

## 2024-04-01 DIAGNOSIS — Z51.81 ENCOUNTER FOR THERAPEUTIC DRUG MONITORING: ICD-10-CM

## 2024-04-01 DIAGNOSIS — M54.16 LUMBAR RADICULOPATHY: ICD-10-CM

## 2024-04-01 DIAGNOSIS — M51.36 LUMBAR DEGENERATIVE DISC DISEASE: ICD-10-CM

## 2024-04-01 DIAGNOSIS — G89.4 CHRONIC PAIN SYNDROME: ICD-10-CM

## 2024-04-01 NOTE — TELEPHONE ENCOUNTER
Resend medication notification.    Who is requesting the change? The pt     Reason why medication needs to be sent:  she moved pharmacy and they can't transfer controlled substance    Medication Name:  gabapentin (NEURONTIN) 600 MG     Send to pharmacy:  [address] [place] [id number]       Pharmacy #:  [phone]

## 2024-04-02 RX ORDER — GABAPENTIN 600 MG/1
600 TABLET ORAL 3 TIMES DAILY
Qty: 90 TABLET | Refills: 1 | Status: SHIPPED | OUTPATIENT
Start: 2024-04-02 | End: 2024-06-01

## 2024-05-10 ENCOUNTER — APPOINTMENT (OUTPATIENT)
Dept: CT IMAGING | Age: 44
End: 2024-05-10
Payer: MEDICARE

## 2024-05-10 ENCOUNTER — HOSPITAL ENCOUNTER (EMERGENCY)
Age: 44
Discharge: HOME | End: 2024-05-10
Attending: EMERGENCY MEDICINE
Payer: MEDICARE

## 2024-05-10 VITALS
HEIGHT: 67 IN | TEMPERATURE: 98.3 F | WEIGHT: 202.6 LBS | DIASTOLIC BLOOD PRESSURE: 71 MMHG | SYSTOLIC BLOOD PRESSURE: 122 MMHG | BODY MASS INDEX: 31.8 KG/M2 | OXYGEN SATURATION: 100 % | HEART RATE: 76 BPM | RESPIRATION RATE: 14 BRPM

## 2024-05-10 DIAGNOSIS — R10.9 ABDOMINAL PAIN, UNSPECIFIED ABDOMINAL LOCATION: ICD-10-CM

## 2024-05-10 DIAGNOSIS — R79.89 ELEVATED TROPONIN: ICD-10-CM

## 2024-05-10 DIAGNOSIS — F19.90 DRUG USE: Primary | ICD-10-CM

## 2024-05-10 DIAGNOSIS — R11.0 NAUSEA: ICD-10-CM

## 2024-05-10 LAB
ALBUMIN SERPL-MCNC: 4.4 G/DL (ref 3.4–5)
ALBUMIN/GLOB SERPL: 1.2 {RATIO} (ref 1.1–2.2)
ALP SERPL-CCNC: 89 U/L (ref 40–129)
ALT SERPL-CCNC: 13 U/L (ref 10–40)
AMPHETAMINES UR QL SCN>1000 NG/ML: ABNORMAL
ANION GAP SERPL CALCULATED.3IONS-SCNC: 21 MMOL/L (ref 3–16)
APAP SERPL-MCNC: <5 UG/ML (ref 10–30)
AST SERPL-CCNC: 26 U/L (ref 15–37)
BARBITURATES UR QL SCN>200 NG/ML: ABNORMAL
BASOPHILS # BLD: 0 K/UL (ref 0–0.2)
BASOPHILS NFR BLD: 0.2 %
BENZODIAZ UR QL SCN>200 NG/ML: ABNORMAL
BILIRUB SERPL-MCNC: 0.8 MG/DL (ref 0–1)
BILIRUB UR QL STRIP.AUTO: ABNORMAL
BUN SERPL-MCNC: 16 MG/DL (ref 7–20)
CALCIUM SERPL-MCNC: 10.3 MG/DL (ref 8.3–10.6)
CANNABINOIDS UR QL SCN>50 NG/ML: ABNORMAL
CHLORIDE SERPL-SCNC: 99 MMOL/L (ref 99–110)
CK SERPL-CCNC: 141 U/L (ref 26–192)
CLARITY UR: CLEAR
CO2 SERPL-SCNC: 18 MMOL/L (ref 21–32)
COCAINE UR QL SCN: ABNORMAL
COLOR UR: YELLOW
CREAT SERPL-MCNC: 0.7 MG/DL (ref 0.6–1.1)
D DIMER: 0.55 UG/ML FEU (ref 0–0.6)
DEPRECATED RDW RBC AUTO: 13.6 % (ref 12.4–15.4)
DRUG SCREEN COMMENT UR-IMP: ABNORMAL
EOSINOPHIL # BLD: 0 K/UL (ref 0–0.6)
EOSINOPHIL NFR BLD: 0.1 %
ETHANOLAMINE SERPL-MCNC: NORMAL MG/DL (ref 0–0.08)
FENTANYL SCREEN, URINE: POSITIVE
GFR SERPLBLD CREATININE-BSD FMLA CKD-EPI: >90 ML/MIN/{1.73_M2}
GLUCOSE SERPL-MCNC: 129 MG/DL (ref 70–99)
GLUCOSE UR STRIP.AUTO-MCNC: NEGATIVE MG/DL
HCG SERPL QL: NEGATIVE
HCT VFR BLD AUTO: 46.8 % (ref 36–48)
HGB BLD-MCNC: 15.7 G/DL (ref 12–16)
HGB UR QL STRIP.AUTO: NEGATIVE
KETONES UR STRIP.AUTO-MCNC: 40 MG/DL
LEUKOCYTE ESTERASE UR QL STRIP.AUTO: NEGATIVE
LYMPHOCYTES # BLD: 1.8 K/UL (ref 1–5.1)
LYMPHOCYTES NFR BLD: 16.2 %
MCH RBC QN AUTO: 29.3 PG (ref 26–34)
MCHC RBC AUTO-ENTMCNC: 33.6 G/DL (ref 31–36)
MCV RBC AUTO: 87 FL (ref 80–100)
METHADONE UR QL SCN>300 NG/ML: ABNORMAL
MONOCYTES # BLD: 1 K/UL (ref 0–1.3)
MONOCYTES NFR BLD: 9 %
NEUTROPHILS # BLD: 8.2 K/UL (ref 1.7–7.7)
NEUTROPHILS NFR BLD: 74.5 %
NITRITE UR QL STRIP.AUTO: NEGATIVE
OPIATES UR QL SCN>300 NG/ML: ABNORMAL
OXYCODONE UR QL SCN: POSITIVE
PCP UR QL SCN>25 NG/ML: ABNORMAL
PH UR STRIP.AUTO: 8.5 [PH] (ref 5–8)
PH UR STRIP: 8.5 [PH]
PLATELET # BLD AUTO: 345 K/UL (ref 135–450)
PMV BLD AUTO: 7.8 FL (ref 5–10.5)
POTASSIUM SERPL-SCNC: 3.8 MMOL/L (ref 3.5–5.1)
PROT SERPL-MCNC: 8.1 G/DL (ref 6.4–8.2)
PROT UR STRIP.AUTO-MCNC: NEGATIVE MG/DL
RBC # BLD AUTO: 5.37 M/UL (ref 4–5.2)
SALICYLATES SERPL-MCNC: 1.9 MG/DL (ref 15–30)
SODIUM SERPL-SCNC: 138 MMOL/L (ref 136–145)
SP GR UR STRIP.AUTO: 1.01 (ref 1–1.03)
TROPONIN, HIGH SENSITIVITY: 16 NG/L (ref 0–14)
TROPONIN, HIGH SENSITIVITY: 20 NG/L (ref 0–14)
UA COMPLETE W REFLEX CULTURE PNL UR: ABNORMAL
UA DIPSTICK W REFLEX MICRO PNL UR: ABNORMAL
URN SPEC COLLECT METH UR: ABNORMAL
UROBILINOGEN UR STRIP-ACNC: 1 E.U./DL
WBC # BLD AUTO: 11 K/UL (ref 4–11)

## 2024-05-10 PROCEDURE — 6360000004 HC RX CONTRAST MEDICATION

## 2024-05-10 PROCEDURE — 80143 DRUG ASSAY ACETAMINOPHEN: CPT

## 2024-05-10 PROCEDURE — 2580000003 HC RX 258

## 2024-05-10 PROCEDURE — 85379 FIBRIN DEGRADATION QUANT: CPT

## 2024-05-10 PROCEDURE — 80179 DRUG ASSAY SALICYLATE: CPT

## 2024-05-10 PROCEDURE — 99285 EMERGENCY DEPT VISIT HI MDM: CPT

## 2024-05-10 PROCEDURE — 85025 COMPLETE CBC W/AUTO DIFF WBC: CPT

## 2024-05-10 PROCEDURE — 82077 ASSAY SPEC XCP UR&BREATH IA: CPT

## 2024-05-10 PROCEDURE — 70450 CT HEAD/BRAIN W/O DYE: CPT

## 2024-05-10 PROCEDURE — 80307 DRUG TEST PRSMV CHEM ANLYZR: CPT

## 2024-05-10 PROCEDURE — 74177 CT ABD & PELVIS W/CONTRAST: CPT

## 2024-05-10 PROCEDURE — 84484 ASSAY OF TROPONIN QUANT: CPT

## 2024-05-10 PROCEDURE — 81003 URINALYSIS AUTO W/O SCOPE: CPT

## 2024-05-10 PROCEDURE — 96374 THER/PROPH/DIAG INJ IV PUSH: CPT

## 2024-05-10 PROCEDURE — 6360000002 HC RX W HCPCS

## 2024-05-10 PROCEDURE — 82550 ASSAY OF CK (CPK): CPT

## 2024-05-10 PROCEDURE — 84703 CHORIONIC GONADOTROPIN ASSAY: CPT

## 2024-05-10 PROCEDURE — 36415 COLL VENOUS BLD VENIPUNCTURE: CPT

## 2024-05-10 PROCEDURE — 93005 ELECTROCARDIOGRAM TRACING: CPT

## 2024-05-10 PROCEDURE — 96375 TX/PRO/DX INJ NEW DRUG ADDON: CPT

## 2024-05-10 PROCEDURE — 80053 COMPREHEN METABOLIC PANEL: CPT

## 2024-05-10 PROCEDURE — 71260 CT THORAX DX C+: CPT

## 2024-05-10 RX ORDER — ONDANSETRON 4 MG/1
4 TABLET, FILM COATED ORAL EVERY 8 HOURS PRN
Qty: 20 TABLET | Refills: 0 | Status: SHIPPED | OUTPATIENT
Start: 2024-05-10

## 2024-05-10 RX ORDER — ONDANSETRON 2 MG/ML
4 INJECTION INTRAMUSCULAR; INTRAVENOUS ONCE
Status: COMPLETED | OUTPATIENT
Start: 2024-05-10 | End: 2024-05-10

## 2024-05-10 RX ORDER — LORAZEPAM 2 MG/ML
2 INJECTION INTRAMUSCULAR ONCE
Status: COMPLETED | OUTPATIENT
Start: 2024-05-10 | End: 2024-05-10

## 2024-05-10 RX ORDER — LORAZEPAM 1 MG/1
2 TABLET ORAL ONCE
Status: DISCONTINUED | OUTPATIENT
Start: 2024-05-10 | End: 2024-05-10

## 2024-05-10 RX ORDER — 0.9 % SODIUM CHLORIDE 0.9 %
1000 INTRAVENOUS SOLUTION INTRAVENOUS ONCE
Status: COMPLETED | OUTPATIENT
Start: 2024-05-10 | End: 2024-05-10

## 2024-05-10 RX ADMIN — LORAZEPAM 2 MG: 2 INJECTION INTRAMUSCULAR; INTRAVENOUS at 15:13

## 2024-05-10 RX ADMIN — ONDANSETRON 4 MG: 2 INJECTION INTRAMUSCULAR; INTRAVENOUS at 14:53

## 2024-05-10 RX ADMIN — SODIUM CHLORIDE 1000 ML: 9 INJECTION, SOLUTION INTRAVENOUS at 14:39

## 2024-05-10 RX ADMIN — IOMEPROL INJECTION 100 ML: 714 INJECTION, SOLUTION INTRAVASCULAR at 16:19

## 2024-05-10 ASSESSMENT — PAIN - FUNCTIONAL ASSESSMENT: PAIN_FUNCTIONAL_ASSESSMENT: NONE - DENIES PAIN

## 2024-05-10 NOTE — ED NOTES
In ER pt not talking at first  then would say  few words at time   become sweaty  admits to using fentanyl 2 days ago recreational   today starting to feel like bugs crawling all over.  Has been treated recently for UTI  states did finish all medication.
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
## 2024-05-10 NOTE — ED NOTES
Pt vomited projectile green liquid large amt.  Pt remains jittery moving legs constantly. Pt states did snort percocet 10 2 of them this AM.

## 2024-05-10 NOTE — DISCHARGE INSTRUCTIONS
Referral for a PCP was provided, please call and establish care.  Please be aware that your troponins, cardiac marker, were elevated, hospital admission was offered but since this was refused referral for cardiology was provided, it is important that you call and establish care.  Zofran was prescribed for the nausea.  Return if you have new or worsening symptoms or is experiencing chest pain, shortness of breath, nausea, headaches, dizziness or any symptoms please return to the ED immediately.
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
## 2024-05-11 LAB
EKG ATRIAL RATE: 81 BPM
EKG DIAGNOSIS: NORMAL
EKG P AXIS: -6 DEGREES
EKG P-R INTERVAL: 120 MS
EKG Q-T INTERVAL: 386 MS
EKG QRS DURATION: 78 MS
EKG QTC CALCULATION (BAZETT): 448 MS
EKG R AXIS: 13 DEGREES
EKG T AXIS: 41 DEGREES
EKG VENTRICULAR RATE: 81 BPM

## 2024-05-11 PROCEDURE — 93010 ELECTROCARDIOGRAM REPORT: CPT | Performed by: INTERNAL MEDICINE

## 2024-05-22 PROBLEM — E78.5 HLD (HYPERLIPIDEMIA): Status: ACTIVE | Noted: 2024-05-22

## 2024-05-22 PROBLEM — I10 PRIMARY HYPERTENSION: Status: ACTIVE | Noted: 2024-05-22

## 2024-05-24 ENCOUNTER — TELEPHONE (OUTPATIENT)
Dept: CARDIOLOGY CLINIC | Age: 44
End: 2024-05-24

## 2024-05-24 NOTE — TELEPHONE ENCOUNTER
Luna called in about her appt. She said it was originally made for 11A today but forgot she can't make it today as she forgot she is closing on her house today.     Is it possible for her to be seen later today. Please advise.

## 2024-06-24 DIAGNOSIS — Z51.81 ENCOUNTER FOR THERAPEUTIC DRUG MONITORING: ICD-10-CM

## 2024-06-24 DIAGNOSIS — G89.4 CHRONIC PAIN SYNDROME: ICD-10-CM

## 2024-06-24 DIAGNOSIS — M54.16 LUMBAR RADICULOPATHY: ICD-10-CM

## 2024-06-24 DIAGNOSIS — M51.36 LUMBAR DEGENERATIVE DISC DISEASE: ICD-10-CM

## 2024-06-25 RX ORDER — GABAPENTIN 600 MG/1
600 TABLET ORAL 3 TIMES DAILY
Qty: 90 TABLET | OUTPATIENT
Start: 2024-06-25

## 2024-07-24 ENCOUNTER — TELEPHONE (OUTPATIENT)
Dept: PAIN MANAGEMENT | Age: 44
End: 2024-07-24

## 2024-07-24 NOTE — TELEPHONE ENCOUNTER
Submitted PA for [facility]  [facility] Key: [id number] STATUS: PENDING.    Follow up done daily; if no decision with in three days we will refax.  If another three days goes by with no decision will call the insurance for status.

## 2024-07-24 NOTE — TELEPHONE ENCOUNTER
The medication is APPROVED.  The patient was notified by phone.    If this requires a response please respond to the pool ( P [facility] MEDICATION PRE-AUTH).      Thank you please advise patient.

## 2024-07-29 DIAGNOSIS — Z51.81 ENCOUNTER FOR THERAPEUTIC DRUG MONITORING: ICD-10-CM

## 2024-07-29 DIAGNOSIS — M54.16 LUMBAR RADICULOPATHY: ICD-10-CM

## 2024-07-29 DIAGNOSIS — G89.4 CHRONIC PAIN SYNDROME: ICD-10-CM

## 2024-07-29 DIAGNOSIS — M51.36 LUMBAR DEGENERATIVE DISC DISEASE: ICD-10-CM

## 2024-07-30 RX ORDER — UBROGEPANT 100 MG/1
TABLET ORAL
Qty: 8 TABLET | Refills: 0 | OUTPATIENT
Start: 2024-07-30

## 2024-07-30 RX ORDER — GABAPENTIN 600 MG/1
600 TABLET ORAL 3 TIMES DAILY
Qty: 90 TABLET | Refills: 1 | OUTPATIENT
Start: 2024-07-30 | End: 2024-09-28

## 2024-08-19 DIAGNOSIS — Z51.81 ENCOUNTER FOR THERAPEUTIC DRUG MONITORING: ICD-10-CM

## 2024-08-19 DIAGNOSIS — G89.4 CHRONIC PAIN SYNDROME: ICD-10-CM

## 2024-08-19 DIAGNOSIS — M51.369 LUMBAR DEGENERATIVE DISC DISEASE: ICD-10-CM

## 2024-08-19 DIAGNOSIS — M54.16 LUMBAR RADICULOPATHY: ICD-10-CM

## 2024-08-20 RX ORDER — GABAPENTIN 600 MG/1
600 TABLET ORAL 3 TIMES DAILY
Qty: 90 TABLET | Refills: 1 | OUTPATIENT
Start: 2024-08-20 | End: 2024-10-19

## 2024-10-14 ENCOUNTER — TELEPHONE (OUTPATIENT)
Dept: PAIN MANAGEMENT | Age: 44
End: 2024-10-14

## 2024-10-14 NOTE — TELEPHONE ENCOUNTER
Submitted RENEWAL PA for [employer] [facility] Key: [id number] STATUS: \"Member has an active PA on file which is expiring on 10/24/2024 and has 998 no. of fills remaining.\"

## 2024-10-23 DIAGNOSIS — M54.16 LUMBAR RADICULOPATHY: ICD-10-CM

## 2024-10-23 DIAGNOSIS — Z51.81 ENCOUNTER FOR THERAPEUTIC DRUG MONITORING: ICD-10-CM

## 2024-10-23 DIAGNOSIS — M51.369 LUMBAR DEGENERATIVE DISC DISEASE: ICD-10-CM

## 2024-10-23 DIAGNOSIS — G89.4 CHRONIC PAIN SYNDROME: ICD-10-CM

## 2024-10-23 RX ORDER — GABAPENTIN 600 MG/1
600 TABLET ORAL 3 TIMES DAILY
Qty: 90 TABLET | OUTPATIENT
Start: 2024-10-23